# Patient Record
Sex: MALE | Race: BLACK OR AFRICAN AMERICAN | NOT HISPANIC OR LATINO | URBAN - METROPOLITAN AREA
[De-identification: names, ages, dates, MRNs, and addresses within clinical notes are randomized per-mention and may not be internally consistent; named-entity substitution may affect disease eponyms.]

---

## 2023-09-27 ENCOUNTER — HOSPITAL ENCOUNTER (OUTPATIENT)
Dept: DATA CONVERSION | Facility: HOSPITAL | Age: 48
Discharge: HOME | End: 2023-09-27

## 2023-09-27 DIAGNOSIS — R05.1 ACUTE COUGH: ICD-10-CM

## 2024-08-22 ENCOUNTER — HOSPITAL ENCOUNTER (EMERGENCY)
Facility: HOSPITAL | Age: 49
Discharge: HOME | End: 2024-08-22
Attending: STUDENT IN AN ORGANIZED HEALTH CARE EDUCATION/TRAINING PROGRAM
Payer: COMMERCIAL

## 2024-08-22 VITALS
DIASTOLIC BLOOD PRESSURE: 88 MMHG | HEART RATE: 80 BPM | WEIGHT: 201 LBS | HEIGHT: 67 IN | OXYGEN SATURATION: 97 % | RESPIRATION RATE: 16 BRPM | BODY MASS INDEX: 31.55 KG/M2 | SYSTOLIC BLOOD PRESSURE: 127 MMHG | TEMPERATURE: 97.5 F

## 2024-08-22 DIAGNOSIS — M54.12 CERVICAL RADICULAR PAIN: Primary | ICD-10-CM

## 2024-08-22 PROCEDURE — 2500000001 HC RX 250 WO HCPCS SELF ADMINISTERED DRUGS (ALT 637 FOR MEDICARE OP): Performed by: STUDENT IN AN ORGANIZED HEALTH CARE EDUCATION/TRAINING PROGRAM

## 2024-08-22 PROCEDURE — 99283 EMERGENCY DEPT VISIT LOW MDM: CPT

## 2024-08-22 PROCEDURE — 2500000004 HC RX 250 GENERAL PHARMACY W/ HCPCS (ALT 636 FOR OP/ED): Performed by: STUDENT IN AN ORGANIZED HEALTH CARE EDUCATION/TRAINING PROGRAM

## 2024-08-22 PROCEDURE — 96372 THER/PROPH/DIAG INJ SC/IM: CPT | Performed by: STUDENT IN AN ORGANIZED HEALTH CARE EDUCATION/TRAINING PROGRAM

## 2024-08-22 RX ORDER — OXYCODONE AND ACETAMINOPHEN 5; 325 MG/1; MG/1
1 TABLET ORAL ONCE
Status: COMPLETED | OUTPATIENT
Start: 2024-08-22 | End: 2024-08-22

## 2024-08-22 RX ORDER — PREDNISONE 50 MG/1
50 TABLET ORAL DAILY
Qty: 5 TABLET | Refills: 0 | Status: SHIPPED | OUTPATIENT
Start: 2024-08-22 | End: 2024-08-27

## 2024-08-22 RX ORDER — KETOROLAC TROMETHAMINE 30 MG/ML
30 INJECTION, SOLUTION INTRAMUSCULAR; INTRAVENOUS ONCE
Status: COMPLETED | OUTPATIENT
Start: 2024-08-22 | End: 2024-08-22

## 2024-08-22 RX ORDER — LIDOCAINE 560 MG/1
1 PATCH PERCUTANEOUS; TOPICAL; TRANSDERMAL DAILY
Status: DISCONTINUED | OUTPATIENT
Start: 2024-08-22 | End: 2024-08-22 | Stop reason: HOSPADM

## 2024-08-22 RX ORDER — LIDOCAINE 50 MG/G
1 PATCH TOPICAL DAILY
Qty: 5 PATCH | Refills: 0 | Status: SHIPPED | OUTPATIENT
Start: 2024-08-22

## 2024-08-22 RX ADMIN — KETOROLAC TROMETHAMINE 30 MG: 30 INJECTION, SOLUTION INTRAMUSCULAR at 05:54

## 2024-08-22 RX ADMIN — OXYCODONE HYDROCHLORIDE AND ACETAMINOPHEN 1 TABLET: 5; 325 TABLET ORAL at 05:53

## 2024-08-22 ASSESSMENT — PAIN DESCRIPTION - LOCATION: LOCATION: NECK

## 2024-08-22 ASSESSMENT — PAIN - FUNCTIONAL ASSESSMENT: PAIN_FUNCTIONAL_ASSESSMENT: 0-10

## 2024-08-22 ASSESSMENT — COLUMBIA-SUICIDE SEVERITY RATING SCALE - C-SSRS
6. HAVE YOU EVER DONE ANYTHING, STARTED TO DO ANYTHING, OR PREPARED TO DO ANYTHING TO END YOUR LIFE?: NO
2. HAVE YOU ACTUALLY HAD ANY THOUGHTS OF KILLING YOURSELF?: NO
1. IN THE PAST MONTH, HAVE YOU WISHED YOU WERE DEAD OR WISHED YOU COULD GO TO SLEEP AND NOT WAKE UP?: NO

## 2024-08-22 ASSESSMENT — PAIN DESCRIPTION - DIRECTION: RADIATING_TOWARDS: BACK AND LEFT ARM

## 2024-08-22 ASSESSMENT — PAIN SCALES - GENERAL: PAINLEVEL_OUTOF10: 9
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
## 2024-08-22 NOTE — Clinical Note
Tim Rodriguez was seen and treated in our emergency department on 8/22/2024.  He may return to work on 08/24/2024.       If you have any questions or concerns, please don't hesitate to call.      Renard Stubbs, DO

## 2024-08-22 NOTE — DISCHARGE INSTRUCTIONS
You should take prednisone 50 mg daily for 5 days rather than the lower dose that you were put on initially.  You can also use lidocaine patches as needed to the affected area, leave the patch on for 12 hours and remove it for 12 hours before putting on the next patch to avoid skin irritation.  You can continue using the Robaxin as a muscle relaxer, you can use Tylenol in addition to the medications as well as using ice or heat to the area.

## 2024-08-23 NOTE — ED PROVIDER NOTES
HPI   Chief Complaint   Patient presents with    Back Pain       This is a 49-year-old male presenting the ED for evaluation of pain that radiates from his neck into his left upper extremity.  He states that he has had chronic neck pain and radicular pain since a work-related injury in 2021, he feels like his symptoms today are similar but more severe than he is used to.  He was seen at [facility] a few days ago for similar complaints and was prescribed a low-dose of steroids, lidocaine patches, and Robaxin which he takes chronically.  He also chronically takes gabapentin for radicular pain.  He presents today with pain that is affecting the left side of the neck and radiating into the left shoulder and sometimes in the left arm.  Pain is worse with bending of his neck towards the affected side and with rotation of the neck.  He denies any loss of strength or sensation to the affected arm.  He denies any associated chest pain or shortness of breath, any new injury.  He came in today hoping for some medication to get some relief from the pain.      History provided by:  Patient   used: No            Patient History   Past Medical History:   Diagnosis Date    Acute upper respiratory infection, unspecified 06/06/2018    Viral upper respiratory infection     Past Surgical History:   Procedure Laterality Date    OTHER SURGICAL HISTORY  10/31/2019    Hemorrhoidectomy     No family history on file.  Social History     Tobacco Use    Smoking status: Not on file    Smokeless tobacco: Not on file   Substance Use Topics    Alcohol use: Not on file    Drug use: Not on file       Physical Exam   ED Triage Vitals [08/22/24 0524]   Temperature Heart Rate Respirations BP   36.4 °C (97.5 °F) 80 16 127/88      Pulse Ox Temp src Heart Rate Source Patient Position   97 % -- -- --      BP Location FiO2 (%)     -- --       Physical Exam  General: well developed, well nourished adult male who is awake and alert, in  no apparent distress.  Family at bedside.  Eyes: sclera clear bilaterally, PERRL, EOMI  HENT: normocephalic, atraumatic. Pharynx without erythema or exudates, uvula midline.  CV: regular rate and rhythm, no murmur, no gallops, or rubs. radial and dorsalis pedis pulses +2/4 bilaterally  Resp: clear to ascultation bilaterally, no wheezes, rales, or rhonchi  GI: abdomen soft, nontender without rigidity or guarding, no peritoneal signs, abdomen is nondistended, no masses palpated  MSK: No midline spinal tenderness, he does have significant tenderness and hypertonicity of the superior border of the trapezius muscle as well as the left-sided cervical paraspinal muscles.  Spurling test is positive reproducing the radiation of pain from the neck down the arm.  Strength +5/5 to upper and lower extremities bilaterally, no swelling of the extremities.  Neuro: Sensation fully intact.  Psych: appropriate mood and affect, cooperative with exam  Skin: warm, dry, without evidence of rash or abrasions    ED Course & MDM   Diagnoses as of 08/23/24 0422   Cervical radicular pain                 No data recorded     [place] Coma Scale Score: 15 (08/22/24 0525 : Ginger Giles RN)                           Medical Decision Making  He is in no acute distress but does appear uncomfortable due to his pain.  His symptoms are consistent with his chronic radicular pain although he does seem to be in indicate exacerbation of this now.  He is neurovascular intact on exam, he has tenderness to palpation and hypertonicity of the paraspinal muscles and the superior border of the trapezius on the left side and a positive Spurling test suggestive of a pinched nerve as the cause of his symptoms.  He has no chest pain or shortness of breath, no exertional symptoms, no concern for ACS today.  He has had extensive outpatient workup including spinal imaging after the initial injury, no indication for advanced imaging today given lack of any new  trauma or injury, lack of any significant neurological dysfunction to suggest spinal cord compression or injury.    He was given a Percocet with improvement of his pain in the ED as well as IM Toradol.  I did increase his dose of steroids which may help him more, he is already on Robaxin and gabapentin which he takes chronically, he is to continue these.  Further outpatient management for this injury were discussed with the patient, he is to follow-up with his spine specialist that he has been seeing since the initial injury, he was discharged improved condition.        Procedure  Procedures     Renard Stubbs,   08/23/24 0423

## 2025-04-28 ENCOUNTER — OFFICE VISIT (OUTPATIENT)
Dept: UROLOGY | Facility: HOSPITAL | Age: 50
End: 2025-04-28
Payer: COMMERCIAL

## 2025-04-28 VITALS — TEMPERATURE: 98.6 F

## 2025-04-28 DIAGNOSIS — N52.9 ERECTILE DYSFUNCTION, UNSPECIFIED ERECTILE DYSFUNCTION TYPE: Primary | ICD-10-CM

## 2025-04-28 PROCEDURE — 99214 OFFICE O/P EST MOD 30 MIN: CPT | Performed by: NURSE PRACTITIONER

## 2025-04-28 PROCEDURE — 99204 OFFICE O/P NEW MOD 45 MIN: CPT | Performed by: NURSE PRACTITIONER

## 2025-04-28 PROCEDURE — 1036F TOBACCO NON-USER: CPT | Performed by: NURSE PRACTITIONER

## 2025-04-28 PROCEDURE — G2211 COMPLEX E/M VISIT ADD ON: HCPCS | Performed by: NURSE PRACTITIONER

## 2025-04-28 RX ORDER — METHOCARBAMOL 750 MG/1
TABLET, FILM COATED ORAL
COMMUNITY
Start: 2024-10-14

## 2025-04-28 RX ORDER — GABAPENTIN 600 MG/1
600 TABLET ORAL 3 TIMES DAILY
COMMUNITY

## 2025-04-28 RX ORDER — ERGOCALCIFEROL 1.25 MG/1
50000 CAPSULE ORAL WEEKLY
COMMUNITY
Start: 2025-02-20

## 2025-04-28 RX ORDER — POLYETHYLENE GLYCOL 3350 17 G/17G
17 POWDER, FOR SOLUTION ORAL
COMMUNITY

## 2025-04-28 ASSESSMENT — PAIN SCALES - GENERAL: PAINLEVEL_OUTOF10: 0-NO PAIN

## 2025-04-29 LAB
CHOLEST SERPL-MCNC: 165 MG/DL
CHOLEST/HDLC SERPL: 3.8 (CALC)
FSH SERPL-ACNC: 4.2 MIU/ML (ref 1.4–12.8)
HDLC SERPL-MCNC: 44 MG/DL
LDLC SERPL CALC-MCNC: 100 MG/DL (CALC)
LH SERPL-ACNC: 4.5 MIU/ML (ref 1.5–9.3)
NONHDLC SERPL-MCNC: 121 MG/DL (CALC)
PSA SERPL-MCNC: 0.43 NG/ML
TESTOST SERPL-MCNC: 252 NG/DL (ref 250–827)
TRIGL SERPL-MCNC: 110 MG/DL

## 2025-05-05 ENCOUNTER — APPOINTMENT (OUTPATIENT)
Dept: UROLOGY | Facility: HOSPITAL | Age: 50
End: 2025-05-05
Payer: COMMERCIAL

## 2025-05-05 VITALS — DIASTOLIC BLOOD PRESSURE: 72 MMHG | HEART RATE: 74 BPM | SYSTOLIC BLOOD PRESSURE: 101 MMHG

## 2025-05-05 DIAGNOSIS — R79.89 LOW TESTOSTERONE: ICD-10-CM

## 2025-05-05 DIAGNOSIS — N52.9 ERECTILE DYSFUNCTION, UNSPECIFIED ERECTILE DYSFUNCTION TYPE: Primary | ICD-10-CM

## 2025-05-05 DIAGNOSIS — Z12.5 PROSTATE CANCER SCREENING: ICD-10-CM

## 2025-05-05 PROCEDURE — 99203 OFFICE O/P NEW LOW 30 MIN: CPT | Performed by: UROLOGY

## 2025-05-05 PROCEDURE — 99213 OFFICE O/P EST LOW 20 MIN: CPT | Performed by: UROLOGY

## 2025-05-05 NOTE — PROGRESS NOTES
"HPI:  49 y.o. yo male patient complains of  decreased libido and sex drive, possible low testosterone    He states he had an accident at work that caused a laceration on his scrotum. Since then, he has been having issues with ED and premature ejaculation.     Erectile Dysfunction:  How long has this been going on? Since 2021  Rigidity - 10 out of 10 lasting 30 mins without any meds   Able to obtain - yes  Able to maintain - no  Pain -no  Curvature - yes, to the right  with or without erection? with  Libido - yes, but not like it used to be  Premature or delayed ejaculation: yes    Prior treatments - yes  Tried PDE5is?:  yes  Viagra/sildenafil - response: worked a little and got headache  Cialis/tadalafil- response: worked a little and got headache  Tried penile injections: no  On nitrates/NTG?: no     Have been on Testosterone /anabolic steroids? no     Relationship status -   Sexual Partners -female     Smoker? 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  Lab Results   Component Value Date    TESTOSTERONE 252 04/28/2025     Lab Results   Component Value Date    LH 4.5 04/28/2025     Lab Results   Component Value Date    FSH 4.2 04/28/2025     No components found for: \"ESTRADIAL\"  Lab Results   Component Value Date    PSA 0.43 04/28/2025     No components found for: \"CBC\"  No results found for: \"PROLACTIN\"  Lab Results   Component Value Date    HGBA1C 4.1 (L) 02/17/2025         PMH:  Medical History[1]     PSH:  Surgical History[2]     Medications:  Current Medications[3]    Allergy:  Allergies[4]     Exam  Testicles descended bilaterally, " nontender, no masses  Vasa palpable bilaterally  Penis circ'd, no lesions, no plaques      Assessment/Plan  #Erectile Dysfunction: I discussed the etiology and different treatment modalities for erectile dysfunction. Specifically, we talked about lifestyle factors like smoking, diet, and exercise. We also discussed ED as a sign of systemic disease, and the contributions of elevated cholesterol and elevated blood sugars on erections. We then discussed treatment modalities, specifically PDE5 inhibitors, intracavernosal injections, vacuum erectile devices, and penile prostheses.    - discussed he is doing well from an ED standpoint given he is able to obtain and maintain an erection for 30 mins without any medications    I reviewed the common causes of hypogonadism with the patient. We will obtain a fasting, morning hypogonadal panel to determine if his hormonal axis is abnormal. We will evaluate his lab results and if needed, at that point we will begin treatment.     We discussed different modalities to treat hypogonadism, including hcg, anastrozole, clomiphene, testosterone gels/creams, nasal testosterone, testosterone pellets, and testosterone injections. I also reviewed with him common risks following testosterone replacement, including cholesterol imbalance, polycythemia, cancer progression and infertility.  He understands these risks and wishes to proceed. The patient is to adhere to a strict followup either every 3-4 months or biannually.  We reviewed with him with our office's consent form.     We also reviewed his fertility status and whether sperm production is a concern.  If so, HCG will be added.  We also discussed the possibility of gynecomastia secondary to testosterone therapy and methods of prevention and/or treatment if this occurs.    Hypogonadism  fasting hypogonadal panel   Screening for polycythemia  Hct  prn phlebotomy for Hct>54    Screening for hyperestrogenemia  E2   arimidex if E is  elevated    Prostate ca screening  psa    Fertility preservation  Not interested    Fu after bloodwork    Scribe Attestation  By signing my name below, I, Laurence Prado Brittaney , Scribe   attest that this documentation has been prepared under the direction and in the presence of Dale Sanon MD.           [1]   Past Medical History:  Diagnosis Date    Acute upper respiratory infection, unspecified 06/06/2018    Viral upper respiratory infection   [2]   Past Surgical History:  Procedure Laterality Date    OTHER SURGICAL HISTORY  10/31/2019    Hemorrhoidectomy   [3]   Current Outpatient Medications:     ergocalciferol (Vitamin D-2) 1250 mcg (50,000 units) capsule, Take 1 capsule (50,000 Units) by mouth once a week., Disp: , Rfl:     gabapentin (Neurontin) 600 mg tablet, Take 1 tablet (600 mg) by mouth 3 times a day., Disp: , Rfl:     lidocaine (Lidoderm) 5 % patch, Place 1 patch over 12 hours on the skin once daily. Remove & discard patch within 12 hours or as directed by MD., Disp: 5 patch, Rfl: 0    methocarbamol (Robaxin) 750 mg tablet, TAKE 1 TABLET BY MOUTH EVERY 8 HOURD AS NEEDED FOR PAIN, Disp: , Rfl:     polyethylene glycol (Glycolax, Miralax) 17 gram packet, Take 17 g by mouth once daily., Disp: , Rfl:   [4] No Known Allergies

## 2025-05-06 ENCOUNTER — APPOINTMENT (OUTPATIENT)
Dept: UROLOGY | Facility: CLINIC | Age: 50
End: 2025-05-06
Payer: COMMERCIAL

## 2025-05-07 LAB
HCT VFR BLD AUTO: 41.7 % (ref 38.5–50)
LH SERPL-ACNC: 3.9 MIU/ML (ref 1.5–9.3)
PROLACTIN SERPL-MCNC: 5.2 NG/ML (ref 2–18)
PSA SERPL-MCNC: 0.42 NG/ML
TESTOST SERPL-MCNC: 223 NG/DL (ref 250–827)

## 2025-05-11 NOTE — PROGRESS NOTES
"HPI:  49 y.o. yo male patient complains of  decreased libido and sex drive, possible low testosterone    Last Visit: 5/5/25  - discussed he is doing well from an ED standpoint given he is able to obtain and maintain an erection for 30 mins without any medications  Hypogonadism  fasting hypogonadal panel   Fu after bloodwork    Today's Visit    He states he had an accident at work that caused a laceration on his scrotum. Since then, he has been having issues with ED and premature ejaculation.     Erectile Dysfunction:  How long has this been going on? Since 2021  Rigidity - 10 out of 10 lasting 30 mins without any meds   Able to obtain - yes  Able to maintain - no  Pain -no  Curvature - yes, to the right  with or without erection? with  Libido - yes, but not like it used to be  Premature or delayed ejaculation: yes    Prior treatments - yes  Tried PDE5is?:  yes  Viagra/sildenafil - response: worked a little and got headache  Cialis/tadalafil- response: worked a little and got headache  Tried penile injections: no  On nitrates/NTG?: no     Have been on Testosterone /anabolic steroids? no     Relationship status -   Sexual Partners -female     Smoker? No    #low testosterone  -T 252  -loss of muscle mass  -low libido   -fatigue  - no known hx of prostate CA  -no heart issues/strokes  -not interested in fertility preservation     Want to preserve fertility: No  Personal history of gynecomastia and/or concerns about the condition: MNo  Family / Personal History of Prostate Cancer: No  MI or Stroke: No    #luts  -urinary urgency     Partner works for [employer]  Lab Results   Component Value Date    TESTOSTERONE 223 (L) 05/06/2025    TESTOSTERONE 252 04/28/2025     Lab Results   Component Value Date    LH 3.9 05/06/2025     Lab Results   Component Value Date    FSH 4.2 04/28/2025     No components found for: \"ESTRADIAL\"  Lab Results   Component Value Date    PSA 0.42 05/06/2025     No components found for: " "\"CBC\"  Lab Results   Component Value Date    PROLACTIN 5.2 05/06/2025     Lab Results   Component Value Date    HGBA1C 4.1 (L) 02/17/2025         PMH:  Medical History[1]     PSH:  Surgical History[2]     Medications:  Current Medications[3]    Allergy:  Allergies[4]     Exam  Testicles descended bilaterally, nontender, no masses  Vasa palpable bilaterally  Penis circ'd, no lesions, no plaques      Assessment/Plan  #Erectile Dysfunction: I discussed the etiology and different treatment modalities for erectile dysfunction. Specifically, we talked about lifestyle factors like smoking, diet, and exercise. We also discussed ED as a sign of systemic disease, and the contributions of elevated cholesterol and elevated blood sugars on erections. We then discussed treatment modalities, specifically PDE5 inhibitors, intracavernosal injections, vacuum erectile devices, and penile prostheses.    - discussed he is doing well from an ED standpoint given he is able to obtain and maintain an erection for 30 mins without any medications    I reviewed the common causes of hypogonadism with the patient. We will obtain a fasting, morning hypogonadal panel to determine if his hormonal axis is abnormal. We will evaluate his lab results and if needed, at that point we will begin treatment.     We discussed different modalities to treat hypogonadism, including hcg, anastrozole, clomiphene, testosterone gels/creams, nasal testosterone, testosterone pellets, and testosterone injections. I also reviewed with him common risks following testosterone replacement, including cholesterol imbalance, polycythemia, cancer progression and infertility.  He understands these risks and wishes to proceed. The patient is to adhere to a strict followup either every 3-4 months or biannually.  We reviewed with him with our office's consent form.     We also reviewed his fertility status and whether sperm production is a concern.  If so, HCG will be added.  We " also discussed the possibility of gynecomastia secondary to testosterone therapy and methods of prevention and/or treatment if this occurs.    Hypogonadism  fasting hypogonadal panel   Screening for polycythemia  Hct  prn phlebotomy for Hct>54    Screening for hyperestrogenemia  E2   arimidex if E is elevated    Prostate ca screening  psa    Fertility preservation  Not interested    Fu after bloodwork    Scribe Attestation  By signing my name below, Laurence PHAM , Scribe   attest that this documentation has been prepared under the direction and in the presence of Dale Sanon MD.           [1]   Past Medical History:  Diagnosis Date    Acute upper respiratory infection, unspecified 06/06/2018    Viral upper respiratory infection   [2]   Past Surgical History:  Procedure Laterality Date    OTHER SURGICAL HISTORY  10/31/2019    Hemorrhoidectomy   [3]   Current Outpatient Medications:     ergocalciferol (Vitamin D-2) 1250 mcg (50,000 units) capsule, Take 1 capsule (50,000 Units) by mouth once a week., Disp: , Rfl:     gabapentin (Neurontin) 600 mg tablet, Take 1 tablet (600 mg) by mouth 3 times a day., Disp: , Rfl:     lidocaine (Lidoderm) 5 % patch, Place 1 patch over 12 hours on the skin once daily. Remove & discard patch within 12 hours or as directed by MD., Disp: 5 patch, Rfl: 0    methocarbamol (Robaxin) 750 mg tablet, TAKE 1 TABLET BY MOUTH EVERY 8 HOURD AS NEEDED FOR PAIN, Disp: , Rfl:     polyethylene glycol (Glycolax, Miralax) 17 gram packet, Take 17 g by mouth once daily., Disp: , Rfl:   [4] No Known Allergies

## 2025-05-12 ENCOUNTER — APPOINTMENT (OUTPATIENT)
Dept: UROLOGY | Facility: HOSPITAL | Age: 50
End: 2025-05-12
Payer: COMMERCIAL

## 2025-05-12 DIAGNOSIS — N52.9 ERECTILE DYSFUNCTION, UNSPECIFIED ERECTILE DYSFUNCTION TYPE: Primary | ICD-10-CM

## 2025-05-12 DIAGNOSIS — Z12.5 PROSTATE CANCER SCREENING: ICD-10-CM

## 2025-05-12 DIAGNOSIS — E29.1 HYPOGONADISM IN MALE: ICD-10-CM

## 2025-05-12 PROCEDURE — 99214 OFFICE O/P EST MOD 30 MIN: CPT | Performed by: UROLOGY

## 2025-05-12 PROCEDURE — G2211 COMPLEX E/M VISIT ADD ON: HCPCS | Performed by: UROLOGY

## 2025-05-12 RX ORDER — TESTOSTERONE 20.25 MG/1.25G
2 GEL TOPICAL DAILY
Qty: 75 G | Refills: 3 | Status: SHIPPED | OUTPATIENT
Start: 2025-05-12

## 2025-07-01 ENCOUNTER — APPOINTMENT (OUTPATIENT)
Dept: GASTROENTEROLOGY | Facility: CLINIC | Age: 50
End: 2025-07-01
Payer: COMMERCIAL

## 2025-07-01 VITALS — BODY MASS INDEX: 32.33 KG/M2 | HEIGHT: 67 IN | WEIGHT: 206 LBS

## 2025-07-01 DIAGNOSIS — K58.1 IRRITABLE BOWEL SYNDROME WITH CONSTIPATION: Primary | ICD-10-CM

## 2025-07-01 PROCEDURE — 99204 OFFICE O/P NEW MOD 45 MIN: CPT | Performed by: INTERNAL MEDICINE

## 2025-07-01 PROCEDURE — 3008F BODY MASS INDEX DOCD: CPT | Performed by: INTERNAL MEDICINE

## 2025-07-01 RX ORDER — GABAPENTIN 600 MG/1
3 TABLET, FILM COATED ORAL
COMMUNITY
Start: 2025-05-11

## 2025-07-01 NOTE — PROGRESS NOTES
"Primary Care Provider: HARITHA Rachel  Referring Provider: No ref. provider found  My final recommendations will be communicated back to the referring physician and/or primary care provider via shared medical records or via US mail.    Chief Complaint (Reason for visit):   Tim Rodriguez is a 49 y.o. male who presents for constipation    ASSESSMENT AND PLAN     Assessment & Plan  Irritable bowel syndrome with constipation  Patient moves his bowels every day, but he has feeling of incomplete evacuation, hard stools.  He has to exert and straining to have bowel movements    He has been taking Oregano oil which has been helping with his bowel movements    Last Cscope [facility] 3/2023    - I will asked the patient to start taking Linzess 72 mcg a day and assess response  - Recall colonoscopy in 3/2028    Orders:    linaCLOtide (Linzess) 72 mcg capsule; Take 1 capsule (72 mcg) by mouth once daily in the morning. Take before meals. Do not crush or chew.        Sheng Pollard MD, MS    SUBJECTIVE   HPI: History obtained from patient.  Patient is a Adela jonesancé    49-year-old male who comes to see me for constipation for the last few months.    Patient states that he has constipation for several years, however his bowel movements have gotten worse in the last few months.  He is on high-dose of gabapentin and Robaxin.  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    He is on gabapentin 1800 mg/day    Patient endorses bowel movements every day, but he has feeling of incomplete evacuation as well as need to strain/push for evacuation      He has been taking Oregano oil which has been helping with his bowel movements    Last Cscope [facility] 3/2023    Medical History[1]    Surgical History[2]    Current Medications[3]    Allergies[4]  OBJECTIVE   PHYSICAL EXAM:  Ht 1.702 m (5' 7\")   Wt 93.4 kg (206 lb)   BMI 32.26 kg/m²      LABS/IMAGING/SCOPES  Lab Results   Component Value Date    HCT " "41.7 05/06/2025     No results found for: \"GLUCOSE\", \"CALCIUM\", \"NA\", \"K\", \"CO2\", \"CL\", \"BUN\", \"CREATININE\"  No results found for: \"ALT\", \"AST\", \"GGT\", \"ALKPHOS\", \"BILITOT\"        Sheng Pollard MD, MS         [1]   Past Medical History:  Diagnosis Date    Acute upper respiratory infection, unspecified 06/06/2018    Viral upper respiratory infection   [2]   Past Surgical History:  Procedure Laterality Date    OTHER SURGICAL HISTORY  10/31/2019    Hemorrhoidectomy   [3]   Current Outpatient Medications   Medication Sig Dispense Refill    gabapentin 600 mg tablet extended release 24 hr Take 3 tablets by mouth once daily in the morning. Take before meals.      ergocalciferol (Vitamin D-2) 1250 mcg (50,000 units) capsule Take 1 capsule (50,000 Units) by mouth once a week.      gabapentin (Neurontin) 600 mg tablet Take 1 tablet (600 mg) by mouth 3 times a day.      lidocaine (Lidoderm) 5 % patch Place 1 patch over 12 hours on the skin once daily. Remove & discard patch within 12 hours or as directed by MD. 5 patch 0    methocarbamol (Robaxin) 750 mg tablet TAKE 1 TABLET BY MOUTH EVERY 8 HOURD AS NEEDED FOR PAIN      polyethylene glycol (Glycolax, Miralax) 17 gram packet Take 17 g by mouth once daily.      testosterone 20.25 mg/1.25 gram (1.62 %) gel in metered-dose pump Place 2 Pump on the skin once daily. 1 pump to each shoulder 75 g 3     No current facility-administered medications for this visit.   [4] No Known Allergies    "

## 2025-07-01 NOTE — LETTER
July 1, 2025     HARITHA Rachel  [address]  [address]  [place] [id number]    Patient: Tim Rodriguez   YOB: 1975   Date of Visit: 7/1/2025       Dear HARITHA Robles:    Thank you for referring Tim Rodriguez to me for evaluation. Below are my notes for this consultation.  If you have questions, please do not hesitate to call me. I look forward to following your patient along with you.       Sincerely,     Sheng Pollard MD, MS      CC: No Recipients  ______________________________________________________________________________________    Primary Care Provider: HARITHA Rachel  Referring Provider: No ref. provider found  My final recommendations will be communicated back to the referring physician and/or primary care provider via shared medical records or via US mail.    Chief Complaint (Reason for visit):   Tim Rodriguez is a 49 y.o. male who presents for constipation    ASSESSMENT AND PLAN     Assessment & Plan  Irritable bowel syndrome with constipation  Patient moves his bowels every day, but he has feeling of incomplete evacuation, hard stools.  He has to exert and straining to have bowel movements    He has been taking Oregano oil which has been helping with his bowel movements    Last Cscope [facility] 3/2023    - I will asked the patient to start taking Linzess 72 mcg a day and assess response  - Recall colonoscopy in 3/2028    Orders:  •  linaCLOtide (Linzess) 72 mcg capsule; Take 1 capsule (72 mcg) by mouth once daily in the morning. Take before meals. Do not crush or chew.        Sheng Pollard MD, MS    SUBJECTIVE   HPI: History obtained from patient.  Patient is a Krysten's fiancé    49-year-old male who comes to see me for constipation for the last few months.    Patient states that he has constipation for several years, however his bowel movements have gotten worse in the last few months.  He is on high-dose of gabapentin and Robaxin.  He has neuropathy from work-related  "accident and has needed lumbar spinal fusion in 2021    He is on gabapentin 1800 mg/day    Patient endorses bowel movements every day, but he has feeling of incomplete evacuation as well as need to strain/push for evacuation      He has been taking Oregano oil which has been helping with his bowel movements    Last [facility]scope [facility] 3/2023    Medical History[1]    Surgical History[2]    Current Medications[3]    Allergies[4]  OBJECTIVE   PHYSICAL EXAM:  Ht 1.702 m (5' 7\")   Wt 93.4 kg (206 lb)   BMI 32.26 kg/m²      LABS/IMAGING/SCOPES  Lab Results   Component Value Date    HCT 41.7 05/06/2025     No results found for: \"GLUCOSE\", \"CALCIUM\", \"NA\", \"K\", \"CO2\", \"CL\", \"BUN\", \"CREATININE\"  No results found for: \"ALT\", \"AST\", \"GGT\", \"ALKPHOS\", \"BILITOT\"        Sheng Pollard MD, MS           [1]  Past Medical History:  Diagnosis Date   • Acute upper respiratory infection, unspecified 06/06/2018    Viral upper respiratory infection   [2]  Past Surgical History:  Procedure Laterality Date   • OTHER SURGICAL HISTORY  10/31/2019    Hemorrhoidectomy   [3]  Current Outpatient Medications   Medication Sig Dispense Refill   • gabapentin 600 mg tablet extended release 24 hr Take 3 tablets by mouth once daily in the morning. Take before meals.     • ergocalciferol (Vitamin D-2) 1250 mcg (50,000 units) capsule Take 1 capsule (50,000 Units) by mouth once a week.     • gabapentin (Neurontin) 600 mg tablet Take 1 tablet (600 mg) by mouth 3 times a day.     • lidocaine (Lidoderm) 5 % patch Place 1 patch over 12 hours on the skin once daily. Remove & discard patch within 12 hours or as directed by MD. 5 patch 0   • methocarbamol (Robaxin) 750 mg tablet TAKE 1 TABLET BY MOUTH EVERY 8 HOURD AS NEEDED FOR PAIN     • polyethylene glycol (Glycolax, Miralax) 17 gram packet Take 17 g by mouth once daily.     • testosterone 20.25 mg/1.25 gram (1.62 %) gel in metered-dose pump Place 2 Pump on the skin once daily. 1 pump to each shoulder 75 g 3     No " current facility-administered medications for this visit.   [4]  No Known Allergies

## 2025-08-12 DIAGNOSIS — E29.1 HYPOGONADISM IN MALE: ICD-10-CM

## 2025-08-12 DIAGNOSIS — Z12.5 PROSTATE CANCER SCREENING: ICD-10-CM

## 2025-08-18 ENCOUNTER — OFFICE VISIT (OUTPATIENT)
Dept: UROLOGY | Facility: HOSPITAL | Age: 50
End: 2025-08-18
Payer: COMMERCIAL

## 2025-08-18 DIAGNOSIS — E29.1 HYPOGONADISM IN MALE: Primary | ICD-10-CM

## 2025-08-18 DIAGNOSIS — N52.9 ERECTILE DYSFUNCTION, UNSPECIFIED ERECTILE DYSFUNCTION TYPE: ICD-10-CM

## 2025-08-18 DIAGNOSIS — Z12.5 PROSTATE CANCER SCREENING: ICD-10-CM

## 2025-08-18 PROCEDURE — 99214 OFFICE O/P EST MOD 30 MIN: CPT | Performed by: UROLOGY

## 2025-08-18 PROCEDURE — 99212 OFFICE O/P EST SF 10 MIN: CPT

## 2025-08-19 LAB
ESTRADIOL SERPL-MCNC: 21 PG/ML
HCT VFR BLD AUTO: 40.1 % (ref 38.5–50)
LH SERPL-ACNC: 5.6 MIU/ML (ref 1.5–9.3)
PSA SERPL-MCNC: 0.38 NG/ML
TESTOST SERPL-MCNC: 131 NG/DL (ref 250–827)

## 2025-08-25 DIAGNOSIS — R19.7 DIARRHEA, UNSPECIFIED TYPE: Primary | ICD-10-CM

## 2025-09-22 ENCOUNTER — APPOINTMENT (OUTPATIENT)
Dept: UROLOGY | Facility: CLINIC | Age: 50
End: 2025-09-22
Payer: COMMERCIAL

## 2025-10-06 ENCOUNTER — APPOINTMENT (OUTPATIENT)
Dept: GASTROENTEROLOGY | Facility: CLINIC | Age: 50
End: 2025-10-06
Payer: COMMERCIAL

## 2025-11-18 ENCOUNTER — APPOINTMENT (OUTPATIENT)
Dept: UROLOGY | Facility: CLINIC | Age: 50
End: 2025-11-18
Payer: COMMERCIAL